# Patient Record
Sex: MALE | Race: WHITE | NOT HISPANIC OR LATINO | Employment: OTHER | URBAN - METROPOLITAN AREA
[De-identification: names, ages, dates, MRNs, and addresses within clinical notes are randomized per-mention and may not be internally consistent; named-entity substitution may affect disease eponyms.]

---

## 2023-12-05 ENCOUNTER — OFFICE VISIT (OUTPATIENT)
Dept: CARDIOLOGY | Facility: CLINIC | Age: 78
End: 2023-12-05
Payer: MEDICARE

## 2023-12-05 VITALS
SYSTOLIC BLOOD PRESSURE: 135 MMHG | DIASTOLIC BLOOD PRESSURE: 82 MMHG | TEMPERATURE: 98.3 F | HEIGHT: 70 IN | WEIGHT: 165.2 LBS | HEART RATE: 73 BPM | BODY MASS INDEX: 23.65 KG/M2

## 2023-12-05 DIAGNOSIS — I47.29 PAROXYSMAL VENTRICULAR TACHYCARDIA (MULTI): ICD-10-CM

## 2023-12-05 DIAGNOSIS — Z95.0 PACEMAKER: ICD-10-CM

## 2023-12-05 DIAGNOSIS — I48.20 CHRONIC ATRIAL FIBRILLATION (MULTI): Primary | ICD-10-CM

## 2023-12-05 DIAGNOSIS — E78.5 HYPERLIPIDEMIA, UNSPECIFIED HYPERLIPIDEMIA TYPE: ICD-10-CM

## 2023-12-05 DIAGNOSIS — I10 HYPERTENSION, UNSPECIFIED TYPE: ICD-10-CM

## 2023-12-05 PROCEDURE — 1126F AMNT PAIN NOTED NONE PRSNT: CPT | Performed by: NURSE PRACTITIONER

## 2023-12-05 PROCEDURE — 1159F MED LIST DOCD IN RCRD: CPT | Performed by: NURSE PRACTITIONER

## 2023-12-05 PROCEDURE — 3079F DIAST BP 80-89 MM HG: CPT | Performed by: NURSE PRACTITIONER

## 2023-12-05 PROCEDURE — 99214 OFFICE O/P EST MOD 30 MIN: CPT | Performed by: NURSE PRACTITIONER

## 2023-12-05 PROCEDURE — 1036F TOBACCO NON-USER: CPT | Performed by: NURSE PRACTITIONER

## 2023-12-05 PROCEDURE — 3075F SYST BP GE 130 - 139MM HG: CPT | Performed by: NURSE PRACTITIONER

## 2023-12-05 PROCEDURE — 1160F RVW MEDS BY RX/DR IN RCRD: CPT | Performed by: NURSE PRACTITIONER

## 2023-12-05 RX ORDER — METOPROLOL TARTRATE 25 MG/1
1 TABLET, FILM COATED ORAL 2 TIMES DAILY
COMMUNITY
Start: 2016-06-01

## 2023-12-05 RX ORDER — PANTOPRAZOLE SODIUM 40 MG/1
1 TABLET, DELAYED RELEASE ORAL DAILY
COMMUNITY
Start: 2016-06-01

## 2023-12-05 RX ORDER — MEMANTINE HYDROCHLORIDE 10 MG/1
TABLET ORAL
COMMUNITY
Start: 2023-10-28

## 2023-12-05 RX ORDER — LISINOPRIL 5 MG/1
1 TABLET ORAL DAILY
COMMUNITY
Start: 2016-06-01

## 2023-12-05 RX ORDER — FINASTERIDE 5 MG/1
1 TABLET, FILM COATED ORAL DAILY
COMMUNITY

## 2023-12-05 RX ORDER — NYSTATIN 100000 U/G
CREAM TOPICAL
COMMUNITY
Start: 2023-11-21

## 2023-12-05 RX ORDER — METFORMIN HYDROCHLORIDE 1000 MG/1
TABLET ORAL
COMMUNITY
Start: 2023-10-01

## 2023-12-05 RX ORDER — ATORVASTATIN CALCIUM 80 MG/1
1 TABLET, FILM COATED ORAL DAILY
COMMUNITY
Start: 2014-01-28

## 2023-12-05 RX ORDER — DONEPEZIL HYDROCHLORIDE 10 MG/1
1 TABLET, FILM COATED ORAL NIGHTLY
COMMUNITY
Start: 2017-06-01

## 2023-12-05 RX ORDER — DILTIAZEM HYDROCHLORIDE 240 MG/1
CAPSULE, COATED, EXTENDED RELEASE ORAL
COMMUNITY
Start: 2023-10-01

## 2023-12-05 RX ORDER — APIXABAN 5 MG/1
1 TABLET, FILM COATED ORAL DAILY
COMMUNITY
Start: 2016-06-01

## 2023-12-05 RX ORDER — ASCORBIC ACID 500 MG
TABLET ORAL
COMMUNITY

## 2023-12-05 NOTE — PROGRESS NOTES
"Chief Complaint:   Atrial Fbrillation      History Of Present Illness:    Seven Khan is a 78 y.o. male here for routine follow-up of permanent atrial fibrillation.  The patient has been asymptomatic.  The patient has been rate-controlled in atrial fibrillation on medical treatment which they are tolerating well and are compliant.  The current treatment strategy is rate control.  The CHADS2-VASC2 score is 4 and the ACC/AHA guidelines recommend anticoagulation for stroke prophylaxis.  The patient is being treated with and has tolerated treatment with no bleeding and is compliant.      Wife passed away 4 days ago. Patient is very sad.     He has no cardiac complaints.     The event was not related to acute coronary syndrome, CAD. LHC showed mild nonobstructive CAD. Being monitored via ppm. On beta blocker    AFlutter [Medical Therapy] - 1/20/2011  SSS [PPM, VR] - 8/28/2015     Allergies:  Patient has no allergy information on record.    Review of Systems  All pertinent systems have been reviewed and are negative except for what is stated in the history of present illness.    All other systems have been reviewed and are negative and noncontributory to this patient's current ailments.     Visit Vitals  /82   Pulse 73   Temp 36.8 °C (98.3 °F)   Ht 1.778 m (5' 10\")   Wt 74.9 kg (165 lb 3.2 oz)   BMI 23.70 kg/m²   Smoking Status Never   BSA 1.92 m²         Objective   Vitals reviewed.   Constitutional:       Appearance: Not in distress. Frail.   Neck:      Vascular: No JVR. JVD normal.   Pulmonary:      Effort: Pulmonary effort is normal.      Breath sounds: Normal breath sounds. No wheezing. No rhonchi. No rales.   Chest:      Chest wall: Not tender to palpatation.   Cardiovascular:      PMI at left midclavicular line. Normal rate. Regular rhythm. Normal S1. Normal S2.       Murmurs: There is no murmur.      No gallop.  No click. No rub.   Edema:     Peripheral edema absent.   Abdominal:      General: Bowel sounds " are normal.      Palpations: Abdomen is soft.      Tenderness: There is no abdominal tenderness.   Musculoskeletal: Normal range of motion.         General: No tenderness. Skin:     General: Skin is warm and dry.   Neurological:      General: No focal deficit present.      Mental Status: Alert and oriented to person, place and time.   Psychiatric:         Mood and Affect: Affect is tearful.         Speech: Speech is delayed.         Behavior: Behavior is withdrawn.       Assessment/Plan   Diagnoses and all orders for this visit:  Chronic atrial fibrillation (CMS/HCC)  - rate controlled with metoprolol and diltiazem  - continue Eliquis   Paroxysmal ventricular tachycardia (CMS/HCC)  - not related to CAD  - being monitored by ppm  Pacemaker  - following with device clinic  Hypertension, unspecified type  - well controlled  Hyperlipidemia, unspecified hyperlipidemia type  - stable    Follow up in 1 year    Current Outpatient Medications:     ascorbic acid (Vitamin C) 500 mg tablet, Take by mouth., Disp: , Rfl:     atorvastatin (Lipitor) 80 mg tablet, Take 1 tablet (80 mg) by mouth once daily., Disp: , Rfl:     dilTIAZem CD (Cardizem CD) 240 mg 24 hr capsule, , Disp: , Rfl:     donepezil (Aricept) 10 mg tablet, Take 1 tablet (10 mg) by mouth once daily at bedtime., Disp: , Rfl:     Eliquis 5 mg tablet, Take 1 tablet (5 mg) by mouth once daily., Disp: , Rfl:     finasteride (Proscar) 5 mg tablet, Take 1 tablet (5 mg) by mouth once daily., Disp: , Rfl:     lisinopril 5 mg tablet, Take 1 tablet (5 mg) by mouth once daily., Disp: , Rfl:     memantine (Namenda) 10 mg tablet, , Disp: , Rfl:     metFORMIN (Glucophage) 1,000 mg tablet, , Disp: , Rfl:     metoprolol tartrate (Lopressor) 25 mg tablet, Take 1 tablet (25 mg) by mouth 2 times a day., Disp: , Rfl:     multivitamin capsule, Take 1 capsule by mouth once daily., Disp: , Rfl:     nystatin (Mycostatin) cream, , Disp: , Rfl:     pantoprazole (ProtoNix) 40 mg EC tablet,  Take 1 tablet (40 mg) by mouth once daily., Disp: , Rfl:

## 2024-01-24 ENCOUNTER — OFFICE VISIT (OUTPATIENT)
Dept: NEUROLOGY | Facility: CLINIC | Age: 79
End: 2024-01-24
Payer: MEDICARE

## 2024-01-24 VITALS
BODY MASS INDEX: 23.49 KG/M2 | HEART RATE: 70 BPM | WEIGHT: 163.7 LBS | SYSTOLIC BLOOD PRESSURE: 122 MMHG | OXYGEN SATURATION: 96 % | DIASTOLIC BLOOD PRESSURE: 77 MMHG

## 2024-01-24 DIAGNOSIS — F02.80 MIXED ALZHEIMER'S AND VASCULAR DEMENTIA (MULTI): Primary | ICD-10-CM

## 2024-01-24 DIAGNOSIS — F01.50 MIXED ALZHEIMER'S AND VASCULAR DEMENTIA (MULTI): Primary | ICD-10-CM

## 2024-01-24 DIAGNOSIS — G30.9 MIXED ALZHEIMER'S AND VASCULAR DEMENTIA (MULTI): Primary | ICD-10-CM

## 2024-01-24 PROCEDURE — 1036F TOBACCO NON-USER: CPT | Performed by: PSYCHIATRY & NEUROLOGY

## 2024-01-24 PROCEDURE — 99213 OFFICE O/P EST LOW 20 MIN: CPT | Performed by: PSYCHIATRY & NEUROLOGY

## 2024-01-24 PROCEDURE — 1126F AMNT PAIN NOTED NONE PRSNT: CPT | Performed by: PSYCHIATRY & NEUROLOGY

## 2024-01-24 PROCEDURE — 1160F RVW MEDS BY RX/DR IN RCRD: CPT | Performed by: PSYCHIATRY & NEUROLOGY

## 2024-01-24 NOTE — PATIENT INSTRUCTIONS
- continue current medications  - follow-up with us in about 6 months    General brain health guidelines:  - make sure your other medical conditions are well controlled (e.g., high blood pressure, high cholesterol, diabetes, sleep apnea etc)  - do not smoke or chew tobacco  - address any sensory deficits (e.g., proper glasses for poor eyesight, hearing aids for hearing loss)  - use a weekly pill box  - eat a heart healthy diet (e.g., lots of fruits and vegetables, low fat and cholesterol)  - exercise at least four days per week, 30 minutes at a time at an intensity that would make it difficult to converse with someone   - make sure you are getting at least 7 hours of quality sleep per night  - keep yourself mentally active daily by reading, playing cards, doing word searches, puzzles, etc.  - stay socially active by being part of a group or organization    Please note that the above may not be an entirely accurate or complete list of your medications, allergies, past medical history, past surgical history, or active problems as the document is completed following your visit.

## 2024-01-24 NOTE — PROGRESS NOTES
Start Time:2:45pm  Stop Time: 3:00pm    Chart reviewed, including physician notes and diagnostic studies, for 2 minutes prior to this appointment.      Accompanied by: son    I last saw him in 2023 at which time we started memantine.  His wife  in 2023 and he had forgotten about this until it was brought up.  His memory and daily functioning continue to decline and he will be moved to memory care in a couple of weeks.  He continues to be very engaged in activities.  His mood is great.  He is eating and sleeping well.  He denies passive death wish and suicidal ideation and there is no psychosis or gait changes.  There have been no side effects to starting the memantine.      No Known Allergies      Current Outpatient Medications:     ascorbic acid (Vitamin C) 500 mg tablet, Take by mouth., Disp: , Rfl:     atorvastatin (Lipitor) 80 mg tablet, Take 1 tablet (80 mg) by mouth once daily., Disp: , Rfl:     dilTIAZem CD (Cardizem CD) 240 mg 24 hr capsule, , Disp: , Rfl:     donepezil (Aricept) 10 mg tablet, Take 1 tablet (10 mg) by mouth once daily at bedtime., Disp: , Rfl:     Eliquis 5 mg tablet, Take 1 tablet (5 mg) by mouth once daily., Disp: , Rfl:     finasteride (Proscar) 5 mg tablet, Take 1 tablet (5 mg) by mouth once daily., Disp: , Rfl:     lisinopril 5 mg tablet, Take 1 tablet (5 mg) by mouth once daily., Disp: , Rfl:     memantine (Namenda) 10 mg tablet, , Disp: , Rfl:     metFORMIN (Glucophage) 1,000 mg tablet, , Disp: , Rfl:     metoprolol tartrate (Lopressor) 25 mg tablet, Take 1 tablet (25 mg) by mouth 2 times a day., Disp: , Rfl:     multivitamin capsule, Take 1 capsule by mouth once daily., Disp: , Rfl:     nystatin (Mycostatin) cream, , Disp: , Rfl:     pantoprazole (ProtoNix) 40 mg EC tablet, Take 1 tablet (40 mg) by mouth once daily., Disp: , Rfl:     Review of Systems  As per HPI, otherwise all other systems have been reviewed are negative for complaint.      Objective   BP  "122/77   Pulse 70   Wt 74.3 kg (163 lb 11.2 oz)   SpO2 96%   BMI 23.49 kg/m²     EXAMINATION  Mental Status Examination  General appearance: well kept, good eye contact, cooperative  Orientation: alert and oriented to place and person  Motor: unremarkable, gait is antalgic  Speech: word-finding problems  Mood: euphoric  Affect: Euthymic, full-range  Passive death wish: No  Suicidal ideation: No  Thought process: amnestic  Thought content: Hallucinations:no, Delusions: none, denied; and not responding to internal stimuli  Insight/Judgment: Fair/Fair  Fund of knowledge: Fair  Recent and remote memory: Poor/Poor  Attention span and concentration: Fair  Language: expressive aphasia    Reg: 3/3    Season: spring x  Year: 1975 x    President: ?    State: [place]  City: [place]  Floor: 2nd x  Place: ?    Recall: 3/3 (1/3 with cues)   [facility] ( 09/27/2023): 12/30 (-4 visuospatial/executive, -2 attention, -2 language, -5 delayed recall, -5 orientation)  \"f\"-5 words  MIS: 3/15     [facility] ( 09/30/2020): 20/30 (-3 visuospatial/executive, -1 attention, -2 language, -3 delayed recall, -1 orientation)  \"f\"=6 words  MIS: 7/15     [facility] 6/18/18- 19/30     [facility] (6/1/2017): 14/30 (-4 visuospatial/executive, -2 attention, -2 language, -5 delayed, -3 orientation)  \"f\"=4 words  1/5 words on delayed recall with cues     [facility] (6/1/16): 17/30 (-3 visuospatial/executive, -3 attention, -2 language, -3 delayed recall, -2 orientation)  \"f\"= 3, \"animals\"=   4/5 words on delayed recall with cues     Formulation: Mr. Khan is a very-pleasant 78 y.o. 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  His cognition and daily functioning have declined to the point that he will be transitioning to a higher level of care soon.  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

## 2024-07-24 ENCOUNTER — OFFICE VISIT (OUTPATIENT)
Dept: NEUROLOGY | Facility: CLINIC | Age: 79
End: 2024-07-24
Payer: MEDICARE

## 2024-07-24 VITALS
HEART RATE: 76 BPM | SYSTOLIC BLOOD PRESSURE: 121 MMHG | BODY MASS INDEX: 23.24 KG/M2 | TEMPERATURE: 95.4 F | WEIGHT: 162 LBS | RESPIRATION RATE: 16 BRPM | DIASTOLIC BLOOD PRESSURE: 87 MMHG

## 2024-07-24 DIAGNOSIS — F01.50 MIXED ALZHEIMER'S AND VASCULAR DEMENTIA (MULTI): Primary | ICD-10-CM

## 2024-07-24 DIAGNOSIS — G30.9 MIXED ALZHEIMER'S AND VASCULAR DEMENTIA (MULTI): Primary | ICD-10-CM

## 2024-07-24 DIAGNOSIS — F02.80 MIXED ALZHEIMER'S AND VASCULAR DEMENTIA (MULTI): Primary | ICD-10-CM

## 2024-07-24 PROCEDURE — 99213 OFFICE O/P EST LOW 20 MIN: CPT | Mod: GC | Performed by: PSYCHIATRY & NEUROLOGY

## 2024-07-24 PROCEDURE — 1126F AMNT PAIN NOTED NONE PRSNT: CPT | Performed by: PSYCHIATRY & NEUROLOGY

## 2024-07-24 PROCEDURE — 1159F MED LIST DOCD IN RCRD: CPT | Performed by: PSYCHIATRY & NEUROLOGY

## 2024-07-24 PROCEDURE — 99213 OFFICE O/P EST LOW 20 MIN: CPT | Performed by: PSYCHIATRY & NEUROLOGY

## 2024-07-24 PROCEDURE — 1160F RVW MEDS BY RX/DR IN RCRD: CPT | Performed by: PSYCHIATRY & NEUROLOGY

## 2024-07-24 ASSESSMENT — PATIENT HEALTH QUESTIONNAIRE - PHQ9
SUM OF ALL RESPONSES TO PHQ9 QUESTIONS 1 AND 2: 0
1. LITTLE INTEREST OR PLEASURE IN DOING THINGS: NOT AT ALL
2. FEELING DOWN, DEPRESSED OR HOPELESS: NOT AT ALL

## 2024-07-24 ASSESSMENT — ENCOUNTER SYMPTOMS
LOSS OF SENSATION IN FEET: 0
OCCASIONAL FEELINGS OF UNSTEADINESS: 0
DEPRESSION: 0

## 2024-07-24 ASSESSMENT — PAIN SCALES - GENERAL: PAINLEVEL: 0-NO PAIN

## 2024-07-24 NOTE — PATIENT INSTRUCTIONS
- continue current medications  - follow-up with me in about 1 year    General brain health guidelines:  - make sure your other medical conditions are well controlled (e.g., high blood pressure, high cholesterol, diabetes, sleep apnea etc)  - do not smoke or chew tobacco  - address any sensory deficits (e.g., proper glasses for poor eyesight, hearing aids for hearing loss)  - use a weekly pill box  - eat a heart healthy diet (e.g., lots of fruits and vegetables, low fat and cholesterol)  - exercise at least four days per week, 30 minutes at a time at an intensity that would make it difficult to converse with someone   - make sure you are getting at least 7 hours of quality sleep per night  - keep yourself mentally active daily by reading, playing cards, doing word searches, puzzles, etc.  - stay socially active by being part of a group or organization    Please note that the above may not be an entirely accurate or complete list of your medications, allergies, past medical history, past surgical history, or active problems as the document is completed following your visit.

## 2024-07-24 NOTE — PROGRESS NOTES
Start Time:2:45pm  Stop Time:3:05pm    Chart reviewed, including physician notes and diagnostic studies, for 2 minutes prior to this appointment.     Accompanied by: son    Formulation: Mr. Khan is a very-pleasant 79 y.o. 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  His cognition and level of functioning have continued to decline as expected for his diagnosis.       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  He continues to do well at [employer].  He is very active with activities and has a special friend there.  His cognition is worse, but there haven't been drastic functional declines.  His mood is good.  He is sleeping and eating well and enjoying himself.  Self-attitude is intact.  He denies passive death wish and suicidal ideation.  There is no psychosis, agitation, or irritability.      No Known Allergies      Current Outpatient Medications:     metFORMIN (Glucophage) 1,000 mg tablet, 0.5 tablets (500 mg)., Disp: , Rfl:     ascorbic acid (Vitamin C) 500 mg tablet, Take by mouth., Disp: , Rfl:     atorvastatin (Lipitor) 80 mg tablet, Take 1 tablet (80 mg) by mouth once daily., Disp: , Rfl:     dilTIAZem CD (Cardizem CD) 240 mg 24 hr capsule, , Disp: , Rfl:     donepezil (Aricept) 10 mg tablet, Take 1 tablet (10 mg) by mouth once daily at  "bedtime., Disp: , Rfl:     Eliquis 5 mg tablet, Take 1 tablet (5 mg) by mouth once daily., Disp: , Rfl:     finasteride (Proscar) 5 mg tablet, Take 1 tablet (5 mg) by mouth once daily., Disp: , Rfl:     lisinopril 5 mg tablet, Take 1 tablet (5 mg) by mouth once daily., Disp: , Rfl:     memantine (Namenda) 10 mg tablet, , Disp: , Rfl:     metoprolol tartrate (Lopressor) 25 mg tablet, Take 1 tablet (25 mg) by mouth 2 times a day., Disp: , Rfl:     multivitamin capsule, Take 1 capsule by mouth once daily., Disp: , Rfl:     nystatin (Mycostatin) cream, , Disp: , Rfl:     pantoprazole (ProtoNix) 40 mg EC tablet, Take 1 tablet (40 mg) by mouth once daily., Disp: , Rfl:     Review of Systems  As per HPI, otherwise all other systems have been reviewed are negative for complaint.      Objective   /87   Pulse 76   Temp 35.2 °C (95.4 °F) (Tympanic)   Resp 16   Wt 73.5 kg (162 lb)   BMI 23.24 kg/m²     EXAMINATION  Mental Status Examination  General appearance: well kept, good eye contact, cooperative  Orientation: alert and oriented to person and partially to time and place  Motor: unremarkable, stable  Speech: word-finding problems  Mood: euphoric  Affect: Euthymic, full-range  Passive death wish: No  Suicidal ideation: No  Thought process: amnestic  Thought content: Hallucinations:no, Delusions: none, denied; and not responding to internal stimuli  Insight/Judgment: Poor/Poor  Fund of knowledge: Fair  Recent and remote memory: Poor/Poor  Attention span and concentration:  Poor  Language: expressive aphasia    Reg: 3/3    Season: summer  Year: 2065 x  Month: ?    President: ?    State: [place]  City: [place]  Place: medical facility x  Floor: 2nd x    Recall: 0/3 (3/3 with cues)     [facility] ( 09/27/2023): 12/30 (-4 visuospatial/executive, -2 attention, -2 language, -5 delayed recall, -5 orientation)  \"f\"-5 words  MIS: 3/15     [facility] ( 09/30/2020): 20/30 (-3 visuospatial/executive, -1 attention, -2 language, -3 delayed " "recall, -1 orientation)  \"f\"=6 words  MIS: 7/15     [facility] 6/18/18- 19/30     [facility] (6/1/2017): 14/30 (-4 visuospatial/executive, -2 attention, -2 language, -5 delayed, -3 orientation)  \"f\"=4 words  1/5 words on delayed recall with cues     [facility] (6/1/16): 17/30 (-3 visuospatial/executive, -3 attention, -2 language, -3 delayed recall, -2 orientation)  \"f\"= 3, \"animals\"=   4/5 words on delayed recall with cues   "